# Patient Record
Sex: FEMALE | Race: BLACK OR AFRICAN AMERICAN | NOT HISPANIC OR LATINO
[De-identification: names, ages, dates, MRNs, and addresses within clinical notes are randomized per-mention and may not be internally consistent; named-entity substitution may affect disease eponyms.]

---

## 2017-10-13 ENCOUNTER — RESULT REVIEW (OUTPATIENT)
Age: 57
End: 2017-10-13

## 2017-12-29 ENCOUNTER — RESULT REVIEW (OUTPATIENT)
Age: 57
End: 2017-12-29

## 2018-02-05 ENCOUNTER — OUTPATIENT (OUTPATIENT)
Dept: OUTPATIENT SERVICES | Facility: HOSPITAL | Age: 58
LOS: 1 days | End: 2018-02-05
Payer: COMMERCIAL

## 2018-02-05 VITALS
HEART RATE: 86 BPM | RESPIRATION RATE: 18 BRPM | OXYGEN SATURATION: 97 % | TEMPERATURE: 98 F | HEIGHT: 59 IN | DIASTOLIC BLOOD PRESSURE: 86 MMHG | SYSTOLIC BLOOD PRESSURE: 120 MMHG | WEIGHT: 287.04 LBS

## 2018-02-05 DIAGNOSIS — N95.9 UNSPECIFIED MENOPAUSAL AND PERIMENOPAUSAL DISORDER: ICD-10-CM

## 2018-02-05 DIAGNOSIS — N84.9 POLYP OF FEMALE GENITAL TRACT, UNSPECIFIED: ICD-10-CM

## 2018-02-05 DIAGNOSIS — Z98.890 OTHER SPECIFIED POSTPROCEDURAL STATES: Chronic | ICD-10-CM

## 2018-02-05 DIAGNOSIS — Z01.818 ENCOUNTER FOR OTHER PREPROCEDURAL EXAMINATION: ICD-10-CM

## 2018-02-05 PROCEDURE — G0463: CPT

## 2018-02-05 NOTE — H&P PST ADULT - NEGATIVE OPHTHALMOLOGIC SYMPTOMS
no blurred vision L/no loss of vision L/no lacrimation L/no loss of vision R/no blurred vision R/no diplopia/no photophobia/no lacrimation R

## 2018-02-05 NOTE — H&P PST ADULT - PMH
Asthma    HPV in female    Hypertension    Infertility, female    Morbid obesity    Seasonal allergies    Uterine polyp

## 2018-02-05 NOTE — H&P PST ADULT - HISTORY OF PRESENT ILLNESS
57year old morbidly obese female with pmhx of infertility, uterine polyp, seasonal allergies and asthma presents today for presurgical testing for scheduled dilation and curettage; hysteroscopy on 2/12/18

## 2018-10-02 ENCOUNTER — RESULT REVIEW (OUTPATIENT)
Age: 58
End: 2018-10-02

## 2018-10-15 PROBLEM — B97.7 PAPILLOMAVIRUS AS THE CAUSE OF DISEASES CLASSIFIED ELSEWHERE: Chronic | Status: ACTIVE | Noted: 2018-02-05

## 2018-10-15 PROBLEM — E66.01 MORBID (SEVERE) OBESITY DUE TO EXCESS CALORIES: Chronic | Status: ACTIVE | Noted: 2018-02-05

## 2018-10-15 PROBLEM — J30.2 OTHER SEASONAL ALLERGIC RHINITIS: Chronic | Status: ACTIVE | Noted: 2018-02-05

## 2018-10-15 PROBLEM — J45.909 UNSPECIFIED ASTHMA, UNCOMPLICATED: Chronic | Status: ACTIVE | Noted: 2018-02-05

## 2018-10-15 PROBLEM — N84.0 POLYP OF CORPUS UTERI: Chronic | Status: ACTIVE | Noted: 2018-02-05

## 2018-10-15 PROBLEM — I10 ESSENTIAL (PRIMARY) HYPERTENSION: Chronic | Status: ACTIVE | Noted: 2018-02-05

## 2018-10-15 PROBLEM — N97.9 FEMALE INFERTILITY, UNSPECIFIED: Chronic | Status: ACTIVE | Noted: 2018-02-05

## 2018-10-22 ENCOUNTER — OUTPATIENT (OUTPATIENT)
Dept: OUTPATIENT SERVICES | Facility: HOSPITAL | Age: 58
LOS: 1 days | End: 2018-10-22
Payer: COMMERCIAL

## 2018-10-22 VITALS
WEIGHT: 281.97 LBS | TEMPERATURE: 98 F | HEIGHT: 59 IN | RESPIRATION RATE: 16 BRPM | DIASTOLIC BLOOD PRESSURE: 85 MMHG | OXYGEN SATURATION: 100 % | HEART RATE: 94 BPM | SYSTOLIC BLOOD PRESSURE: 131 MMHG

## 2018-10-22 DIAGNOSIS — Z98.890 OTHER SPECIFIED POSTPROCEDURAL STATES: Chronic | ICD-10-CM

## 2018-10-22 DIAGNOSIS — I10 ESSENTIAL (PRIMARY) HYPERTENSION: ICD-10-CM

## 2018-10-22 DIAGNOSIS — N84.9 POLYP OF FEMALE GENITAL TRACT, UNSPECIFIED: ICD-10-CM

## 2018-10-22 DIAGNOSIS — Z01.818 ENCOUNTER FOR OTHER PREPROCEDURAL EXAMINATION: ICD-10-CM

## 2018-10-22 PROCEDURE — G0463: CPT

## 2018-10-22 RX ORDER — SODIUM CHLORIDE 9 MG/ML
3 INJECTION INTRAMUSCULAR; INTRAVENOUS; SUBCUTANEOUS EVERY 8 HOURS
Qty: 0 | Refills: 0 | Status: DISCONTINUED | OUTPATIENT
Start: 2018-10-29 | End: 2018-11-06

## 2018-10-22 NOTE — H&P PST ADULT - FAMILY HISTORY
Mother  Still living? No  Family history of diabetes mellitus in mother, Age at diagnosis: Age Unknown Mother  Still living? No  Family history of diabetes mellitus in mother, Age at diagnosis: Age Unknown     Sibling  Still living? No  End-stage renal disease on hemodialysis, Age at diagnosis: Age Unknown

## 2018-10-22 NOTE — H&P PST ADULT - PMH
Asthma    HPV in female    Hypertension    Infertility, female    Morbid obesity    Polyp of female genital tract    Seasonal allergies    Uterine polyp

## 2018-10-22 NOTE — H&P PST ADULT - NEGATIVE ENMT SYMPTOMS
no nasal congestion/no nasal discharge/no ear pain/no tinnitus/no dry mouth/no vertigo/no sinus symptoms/no hearing difficulty

## 2018-10-22 NOTE — H&P PST ADULT - HISTORY OF PRESENT ILLNESS
59 yo female with history of morbid obesity, HTN, Asthma, reports the above 57 yo female with history of morbid obesity, HTN, Asthma, reports the above. Patient denies bleeding at this time

## 2018-10-22 NOTE — H&P PST ADULT - NEGATIVE ALLERGY TYPES
no indoor environmental allergies/no reactions to animals/no reactions to food/no reactions to insect bites

## 2018-10-28 ENCOUNTER — TRANSCRIPTION ENCOUNTER (OUTPATIENT)
Age: 58
End: 2018-10-28

## 2018-10-29 ENCOUNTER — RESULT REVIEW (OUTPATIENT)
Age: 58
End: 2018-10-29

## 2018-10-29 ENCOUNTER — OUTPATIENT (OUTPATIENT)
Dept: OUTPATIENT SERVICES | Facility: HOSPITAL | Age: 58
LOS: 1 days | End: 2018-10-29
Payer: COMMERCIAL

## 2018-10-29 VITALS
HEART RATE: 119 BPM | TEMPERATURE: 98 F | SYSTOLIC BLOOD PRESSURE: 148 MMHG | WEIGHT: 281.97 LBS | HEIGHT: 59 IN | OXYGEN SATURATION: 99 % | RESPIRATION RATE: 14 BRPM | DIASTOLIC BLOOD PRESSURE: 93 MMHG

## 2018-10-29 VITALS
OXYGEN SATURATION: 97 % | TEMPERATURE: 99 F | HEART RATE: 96 BPM | RESPIRATION RATE: 16 BRPM | DIASTOLIC BLOOD PRESSURE: 45 MMHG | SYSTOLIC BLOOD PRESSURE: 114 MMHG

## 2018-10-29 DIAGNOSIS — N84.9 POLYP OF FEMALE GENITAL TRACT, UNSPECIFIED: ICD-10-CM

## 2018-10-29 DIAGNOSIS — Z98.890 OTHER SPECIFIED POSTPROCEDURAL STATES: Chronic | ICD-10-CM

## 2018-10-29 DIAGNOSIS — Z01.818 ENCOUNTER FOR OTHER PREPROCEDURAL EXAMINATION: ICD-10-CM

## 2018-10-29 PROCEDURE — 58558 HYSTEROSCOPY BIOPSY: CPT

## 2018-10-29 PROCEDURE — 86900 BLOOD TYPING SEROLOGIC ABO: CPT

## 2018-10-29 PROCEDURE — 86901 BLOOD TYPING SEROLOGIC RH(D): CPT

## 2018-10-29 PROCEDURE — 86850 RBC ANTIBODY SCREEN: CPT

## 2018-10-29 RX ORDER — IBUPROFEN 200 MG
600 TABLET ORAL EVERY 6 HOURS
Qty: 0 | Refills: 0 | Status: DISCONTINUED | OUTPATIENT
Start: 2018-10-29 | End: 2018-11-06

## 2018-10-29 RX ORDER — IBUPROFEN 200 MG
1 TABLET ORAL
Qty: 0 | Refills: 0 | COMMUNITY

## 2018-10-29 RX ORDER — ASCORBIC ACID 60 MG
1 TABLET,CHEWABLE ORAL
Qty: 0 | Refills: 0 | COMMUNITY

## 2018-10-29 RX ORDER — SODIUM CHLORIDE 9 MG/ML
1000 INJECTION, SOLUTION INTRAVENOUS
Qty: 0 | Refills: 0 | Status: DISCONTINUED | OUTPATIENT
Start: 2018-10-29 | End: 2018-10-29

## 2018-10-29 RX ORDER — IBUPROFEN 200 MG
1 TABLET ORAL
Qty: 30 | Refills: 0 | OUTPATIENT
Start: 2018-10-29

## 2018-10-29 RX ORDER — OLMESARTAN MEDOXOMIL / AMLODIPINE BESYLATE / HYDROCHLOROTHIAZIDE 40; 10; 25 MG/1; MG/1; MG/1
1 TABLET, FILM COATED ORAL
Qty: 0 | Refills: 0 | COMMUNITY

## 2018-10-29 RX ORDER — GLUCOSAMINE/MSM/CHONDROITIN A 750-375MG
1 TABLET ORAL
Qty: 0 | Refills: 0 | COMMUNITY

## 2018-10-29 RX ORDER — AZELASTINE 137 UG/1
2 SPRAY, METERED NASAL
Qty: 0 | Refills: 0 | COMMUNITY

## 2018-10-29 RX ORDER — ONDANSETRON 8 MG/1
4 TABLET, FILM COATED ORAL ONCE
Qty: 0 | Refills: 0 | Status: DISCONTINUED | OUTPATIENT
Start: 2018-10-29 | End: 2018-10-29

## 2018-10-29 RX ORDER — ACETAMINOPHEN 500 MG
1000 TABLET ORAL ONCE
Qty: 0 | Refills: 0 | Status: DISCONTINUED | OUTPATIENT
Start: 2018-10-29 | End: 2018-10-29

## 2018-10-29 RX ORDER — FLUTICASONE PROPIONATE AND SALMETEROL 50; 250 UG/1; UG/1
1 POWDER ORAL; RESPIRATORY (INHALATION)
Qty: 0 | Refills: 0 | COMMUNITY

## 2018-10-29 RX ORDER — FENTANYL CITRATE 50 UG/ML
25 INJECTION INTRAVENOUS
Qty: 0 | Refills: 0 | Status: DISCONTINUED | OUTPATIENT
Start: 2018-10-29 | End: 2018-10-29

## 2018-10-29 RX ADMIN — SODIUM CHLORIDE 125 MILLILITER(S): 9 INJECTION, SOLUTION INTRAVENOUS at 15:00

## 2018-10-29 NOTE — BRIEF OPERATIVE NOTE - PROCEDURE
<<-----Click on this checkbox to enter Procedure Polypectomy  10/29/2018    Active  [email or web address]  Hysteroscopy with dilation and curettage of uterus  10/29/2018    Active  [email or web address]

## 2018-10-29 NOTE — ASU DISCHARGE PLAN (ADULT/PEDIATRIC). - ACTIVITY LEVEL
no weight bearing/no tampons/no intercourse/nothing per vagina/no douching/no heavy lifting/no exercise/nothing per rectum/no tub baths/no sports/gym nothing per rectum/no exercise/no intercourse/nothing per vagina/no tub baths/no douching/no sports/gym/no tampons/no heavy lifting

## 2018-10-29 NOTE — ASU DISCHARGE PLAN (ADULT/PEDIATRIC). - NOTIFY
GYN Fever>100.4/Unable to Urinate/Persistent Nausea and Vomiting/Increased Irritability or Sluggishness/Excessive Diarrhea/Inability to Tolerate Liquids or Foods/Pain not relieved by Medications/Numbness, tingling Increased Irritability or Sluggishness/Bleeding that does not stop/Unable to Urinate/Persistent Nausea and Vomiting/Inability to Tolerate Liquids or Foods/Pain not relieved by Medications/GYN Fever>100.4/Numbness, tingling/Excessive Diarrhea

## 2018-10-29 NOTE — BRIEF OPERATIVE NOTE - OPERATION/FINDINGS
Uterus anteverted, midline, mobile, sounded to 7 cm depth, proliferative type endometrium and polyp noted

## 2018-10-29 NOTE — ASU DISCHARGE PLAN (ADULT/PEDIATRIC). - MEDICATION SUMMARY - MEDICATIONS TO TAKE
I will START or STAY ON the medications listed below when I get home from the hospital:    ibuprofen 600 mg oral tablet  -- 1 tab(s) by mouth every 6 hours   -- Do not take this drug if you are pregnant.  It is very important that you take or use this exactly as directed.  Do not skip doses or discontinue unless directed by your doctor.  May cause drowsiness or dizziness.  Obtain medical advice before taking any non-prescription drugs as some may affect the action of this medication.  Take with food or milk.    -- Indication: For as needed for pain

## 2018-10-29 NOTE — BRIEF OPERATIVE NOTE - POST-OP DX
Endometrial polyp  10/29/2018    Active  Josef Collier  PMB (postmenopausal bleeding)  10/29/2018    Active  Nando, Josef

## 2019-12-09 NOTE — H&P PST ADULT - VENOUS THROMBOEMBOLISM HISTORY
The patient has been examined and the H&P has been reviewed:    I concur with the findings and no changes have occurred since H&P was written.    Anesthesia/Surgery risks, benefits and alternative options discussed and understood by patient/family.          Active Hospital Problems    Diagnosis  POA    Arthritis of right knee [M17.11]  Yes      Resolved Hospital Problems   No resolved problems to display.      negative history

## 2020-07-22 ENCOUNTER — RESULT REVIEW (OUTPATIENT)
Age: 60
End: 2020-07-22

## 2020-08-26 ENCOUNTER — RESULT REVIEW (OUTPATIENT)
Age: 60
End: 2020-08-26

## 2021-08-20 NOTE — BRIEF OPERATIVE NOTE - PRE-OP DX
Refill request from [facility] pharmacy for atorvastatin 80. Patient hasn't been seen since 4/22/20 and a refill letter had been sent 5/13/21. Patient needs to establish with new Cardiologist.  was left with patient to schedule a an appointment to establish care. 30 day supply of atorvastatin sent to pharmacy. MANFRED Metcalf RN.   
Endometrial polyp  10/29/2018    Active  Josef Collier  PMB (postmenopausal bleeding)  10/29/2018    Active  Nando, Josef

## 2021-12-07 ENCOUNTER — RESULT REVIEW (OUTPATIENT)
Age: 61
End: 2021-12-07

## 2022-02-28 PROBLEM — N84.9 POLYP OF FEMALE GENITAL TRACT, UNSPECIFIED: Chronic | Status: ACTIVE | Noted: 2018-10-22

## 2022-03-03 ENCOUNTER — NON-APPOINTMENT (OUTPATIENT)
Age: 62
End: 2022-03-03

## 2022-03-03 PROBLEM — Z00.00 ENCOUNTER FOR PREVENTIVE HEALTH EXAMINATION: Status: ACTIVE | Noted: 2022-03-03

## 2022-03-19 ENCOUNTER — APPOINTMENT (OUTPATIENT)
Dept: ORTHOPEDIC SURGERY | Facility: CLINIC | Age: 62
End: 2022-03-19

## 2025-01-12 NOTE — H&P PST ADULT - NS PRO TALK SOMEONE YN
no
22yr old F PMH  of anxiety and depression presents to ED for vomiting and bloody diarrhea that started earlier today after eating chicken teriyaki. Pt admitted for nausea/vomiting, blood streaked stools and bandemia.     #Enteritis  -Pt presenting with multiple episodes of nausea and vomiting starting earlier today as well as blood tinged stools after [place] the mall.   -in ED given: Zosyn 3.375g x1, NS 1L x2   -CT A/P: Fluid-filled nondilated loops of small bowel, which can be seen with nonspecific enteritis.  -continue Zosyn 3.375mg q8  -continue IV fluids   -bandemia (14% neutrophil bandemia) noted on ED labs, f/u AM CBC   -order FOBT, f/u results   -order GI PCR, f/u results   -ID consulted (Dr. Hamlin) f/u recs  -GI consulted (Dr. Gardner), f/u recs    Agree with Resident's Note. Refer to resident's note for chronic comorbidities  76 minutes spent on total encounter. The necessity of the time spent during the encounter on this date of service was due to:   activities including direct patient care, counseling and/or coordinating care, and reviewing notes/lab data/imaging.